# Patient Record
Sex: MALE | Race: WHITE | URBAN - METROPOLITAN AREA
[De-identification: names, ages, dates, MRNs, and addresses within clinical notes are randomized per-mention and may not be internally consistent; named-entity substitution may affect disease eponyms.]

---

## 2018-01-16 ENCOUNTER — TELEPHONE (OUTPATIENT)
Dept: ENT CLINIC | Age: 48
End: 2018-01-16

## 2018-01-18 ENCOUNTER — OFFICE VISIT (OUTPATIENT)
Dept: ENT CLINIC | Age: 48
End: 2018-01-18

## 2018-01-18 VITALS
BODY MASS INDEX: 26.66 KG/M2 | TEMPERATURE: 97.8 F | WEIGHT: 180 LBS | HEART RATE: 57 BPM | OXYGEN SATURATION: 100 % | SYSTOLIC BLOOD PRESSURE: 133 MMHG | HEIGHT: 69 IN | DIASTOLIC BLOOD PRESSURE: 69 MMHG

## 2018-01-18 DIAGNOSIS — K11.20 SIALADENITIS: Primary | ICD-10-CM

## 2018-01-18 PROCEDURE — G8427 DOCREV CUR MEDS BY ELIG CLIN: HCPCS | Performed by: OTOLARYNGOLOGY

## 2018-01-18 PROCEDURE — G8484 FLU IMMUNIZE NO ADMIN: HCPCS | Performed by: OTOLARYNGOLOGY

## 2018-01-18 PROCEDURE — G8419 CALC BMI OUT NRM PARAM NOF/U: HCPCS | Performed by: OTOLARYNGOLOGY

## 2018-01-18 PROCEDURE — 99203 OFFICE O/P NEW LOW 30 MIN: CPT | Performed by: OTOLARYNGOLOGY

## 2018-01-18 PROCEDURE — 1036F TOBACCO NON-USER: CPT | Performed by: OTOLARYNGOLOGY

## 2018-01-18 RX ORDER — IBUPROFEN 200 MG
200 TABLET ORAL EVERY 6 HOURS PRN
COMMUNITY

## 2018-01-18 RX ORDER — AMOXICILLIN AND CLAVULANATE POTASSIUM 875; 125 MG/1; MG/1
1 TABLET, FILM COATED ORAL 2 TIMES DAILY
COMMUNITY

## 2018-01-18 RX ORDER — CHLORAL HYDRATE 500 MG
CAPSULE ORAL
COMMUNITY

## 2018-01-18 NOTE — PROGRESS NOTES
133/69   Site: Left Arm   Position: Sitting   Cuff Size: Medium Adult   Pulse: 57   Temp: 97.8 °F (36.6 °C)   TempSrc: Oral   SpO2: 100%   Weight: 180 lb (81.6 kg)   Height: 5' 9\" (1.753 m)     GENERAL. APPEARANCE:  Well developed, well nourished, no apparent distress, no apparent deformities. ABILITY TO COMMUNICATE/QUALITY OF VOICE:  Communicated without difficulty. Normal voice, with no hot potato quality. INSPECTION, HEAD AND FACE:  Normal overall appearance, with no scars, lesions or masses. SINUSES:  The maxillary and frontal sinuses were nontender, bilaterally. SALIVARY GLANDS:  Parotid, submandibular and sublingual glands were normal.  FACIAL STRENGTH, MOTION:  Normal and equal for all five branches bilaterally. EYES:  Extraocular motion:  intact, normal primary gaze alignment. EARS, OTOSCOPY:  A mild cerumen accumulation was removed from the right external auditory canal.  The TMs and EACs appeared to be normal bilaterally. EXTERNAL EAR/NOSE:  Normal pinnae and mastoids. Normal external nose. NOSE:  The nasal septum was midline. The inferior turbinates were normal.  The nasal mucosa and secretions were normal.  No pus or polyps were seen. LIPS, TEETH AND GUMS:  Normal.  (+) OROPHARYNX/ORAL CAVITY:  There was a white discoloration, induration, and swelling of the left Guilford's duct papilla. There was a possible stone palpable just proximal to the Guilford's papilla. The orifice of the papilla appeared to be patent and normal appearing saliva was expressed on milking of the gland. Attempts at milking the gland and duct and the papilla with digital palpation and with a Q-tip at the papilla, to attempt to express a stone were not successful. There were several nodular areas along the left Guilford's duct which were consistent with possible stone versus sublingual gland sialadenitis. There was an ulceration on the superior left tonsil approximately 1 cm in diameter with a granulomatous.   There was

## 2024-08-06 ENCOUNTER — HOSPITAL ENCOUNTER (EMERGENCY)
Age: 54
Discharge: HOME | End: 2024-08-06
Payer: COMMERCIAL

## 2024-08-06 ENCOUNTER — APPOINTMENT (OUTPATIENT)
Dept: CT IMAGING | Age: 54
End: 2024-08-06
Payer: COMMERCIAL

## 2024-08-06 VITALS
TEMPERATURE: 97.7 F | DIASTOLIC BLOOD PRESSURE: 83 MMHG | SYSTOLIC BLOOD PRESSURE: 144 MMHG | WEIGHT: 187.39 LBS | OXYGEN SATURATION: 100 % | HEIGHT: 70 IN | HEART RATE: 57 BPM | BODY MASS INDEX: 26.83 KG/M2

## 2024-08-06 DIAGNOSIS — N20.0 KIDNEY STONE: Primary | ICD-10-CM

## 2024-08-06 LAB
ANION GAP SERPL CALCULATED.3IONS-SCNC: 9 MMOL/L (ref 3–16)
BACTERIA URNS QL MICRO: ABNORMAL /HPF
BASOPHILS # BLD: 0 K/UL (ref 0–0.2)
BASOPHILS NFR BLD: 0.4 %
BILIRUB UR QL STRIP.AUTO: NEGATIVE
BUN SERPL-MCNC: 23 MG/DL (ref 7–20)
CALCIUM SERPL-MCNC: 10 MG/DL (ref 8.3–10.6)
CHLORIDE SERPL-SCNC: 104 MMOL/L (ref 99–110)
CLARITY UR: CLEAR
CO2 SERPL-SCNC: 27 MMOL/L (ref 21–32)
COLOR UR: YELLOW
CREAT SERPL-MCNC: 1.2 MG/DL (ref 0.9–1.3)
DEPRECATED RDW RBC AUTO: 14 % (ref 12.4–15.4)
EOSINOPHIL # BLD: 0.1 K/UL (ref 0–0.6)
EOSINOPHIL NFR BLD: 0.8 %
EPI CELLS #/AREA URNS AUTO: 1 /HPF (ref 0–5)
GFR SERPLBLD CREATININE-BSD FMLA CKD-EPI: 73 ML/MIN/{1.73_M2}
GLUCOSE SERPL-MCNC: 99 MG/DL (ref 70–99)
GLUCOSE UR STRIP.AUTO-MCNC: NEGATIVE MG/DL
HCT VFR BLD AUTO: 42.7 % (ref 40.5–52.5)
HGB BLD-MCNC: 14.7 G/DL (ref 13.5–17.5)
HGB UR QL STRIP.AUTO: ABNORMAL
HYALINE CASTS #/AREA URNS AUTO: 0 /LPF (ref 0–8)
KETONES UR STRIP.AUTO-MCNC: NEGATIVE MG/DL
LEUKOCYTE ESTERASE UR QL STRIP.AUTO: NEGATIVE
LYMPHOCYTES # BLD: 0.7 K/UL (ref 1–5.1)
LYMPHOCYTES NFR BLD: 11.8 %
MCH RBC QN AUTO: 28.8 PG (ref 26–34)
MCHC RBC AUTO-ENTMCNC: 34.5 G/DL (ref 31–36)
MCV RBC AUTO: 83.3 FL (ref 80–100)
MONOCYTES # BLD: 0.4 K/UL (ref 0–1.3)
MONOCYTES NFR BLD: 6.4 %
NEUTROPHILS # BLD: 4.9 K/UL (ref 1.7–7.7)
NEUTROPHILS NFR BLD: 80.6 %
NITRITE UR QL STRIP.AUTO: NEGATIVE
PH UR STRIP.AUTO: 5.5 [PH] (ref 5–8)
PLATELET # BLD AUTO: 212 K/UL (ref 135–450)
PMV BLD AUTO: 7.4 FL (ref 5–10.5)
POTASSIUM SERPL-SCNC: 5 MMOL/L (ref 3.5–5.1)
PROT UR STRIP.AUTO-MCNC: ABNORMAL MG/DL
RBC # BLD AUTO: 5.13 M/UL (ref 4.2–5.9)
RBC CLUMPS #/AREA URNS AUTO: 9 /HPF (ref 0–4)
SODIUM SERPL-SCNC: 140 MMOL/L (ref 136–145)
SP GR UR STRIP.AUTO: 1.02 (ref 1–1.03)
UA COMPLETE W REFLEX CULTURE PNL UR: ABNORMAL
UA DIPSTICK W REFLEX MICRO PNL UR: YES
URN SPEC COLLECT METH UR: ABNORMAL
UROBILINOGEN UR STRIP-ACNC: 0.2 E.U./DL
WBC # BLD AUTO: 6.1 K/UL (ref 4–11)
WBC #/AREA URNS AUTO: 2 /HPF (ref 0–5)

## 2024-08-06 PROCEDURE — 80048 BASIC METABOLIC PNL TOTAL CA: CPT

## 2024-08-06 PROCEDURE — 85025 COMPLETE CBC W/AUTO DIFF WBC: CPT

## 2024-08-06 PROCEDURE — 74177 CT ABD & PELVIS W/CONTRAST: CPT

## 2024-08-06 PROCEDURE — 99285 EMERGENCY DEPT VISIT HI MDM: CPT

## 2024-08-06 PROCEDURE — 6360000004 HC RX CONTRAST MEDICATION: Performed by: PHYSICIAN ASSISTANT

## 2024-08-06 PROCEDURE — 81001 URINALYSIS AUTO W/SCOPE: CPT

## 2024-08-06 RX ORDER — TAMSULOSIN HYDROCHLORIDE 0.4 MG/1
0.4 CAPSULE ORAL DAILY
Qty: 30 CAPSULE | Refills: 0 | Status: SHIPPED | OUTPATIENT
Start: 2024-08-06 | End: 2024-09-05

## 2024-08-06 RX ORDER — ONDANSETRON 4 MG/1
4 TABLET, ORALLY DISINTEGRATING ORAL 3 TIMES DAILY PRN
Qty: 12 TABLET | Refills: 0 | Status: SHIPPED | OUTPATIENT
Start: 2024-08-06

## 2024-08-06 RX ORDER — HYDROCODONE BITARTRATE AND ACETAMINOPHEN 5; 325 MG/1; MG/1
1 TABLET ORAL EVERY 6 HOURS PRN
Qty: 12 TABLET | Refills: 0 | Status: SHIPPED | OUTPATIENT
Start: 2024-08-06 | End: 2024-08-09

## 2024-08-06 RX ADMIN — IOPAMIDOL 75 ML: 755 INJECTION, SOLUTION INTRAVENOUS at 13:06

## 2024-08-06 ASSESSMENT — ENCOUNTER SYMPTOMS
EYE PAIN: 0
BACK PAIN: 0
ABDOMINAL PAIN: 0
SHORTNESS OF BREATH: 0
SORE THROAT: 0
COUGH: 0
NAUSEA: 1
VOMITING: 0

## 2024-08-06 ASSESSMENT — PAIN SCALES - GENERAL: PAINLEVEL_OUTOF10: 0

## 2024-08-06 ASSESSMENT — PAIN - FUNCTIONAL ASSESSMENT
PAIN_FUNCTIONAL_ASSESSMENT: 0-10
PAIN_FUNCTIONAL_ASSESSMENT: ACTIVITIES ARE NOT PREVENTED

## 2024-08-06 ASSESSMENT — PAIN DESCRIPTION - DESCRIPTORS: DESCRIPTORS: ACHING

## 2024-08-06 ASSESSMENT — PAIN DESCRIPTION - LOCATION: LOCATION: ABDOMEN

## 2024-08-06 ASSESSMENT — LIFESTYLE VARIABLES
HOW MANY STANDARD DRINKS CONTAINING ALCOHOL DO YOU HAVE ON A TYPICAL DAY: 1 OR 2
HOW OFTEN DO YOU HAVE A DRINK CONTAINING ALCOHOL: MONTHLY OR LESS

## 2024-08-06 ASSESSMENT — PAIN DESCRIPTION - PAIN TYPE: TYPE: ACUTE PAIN

## 2024-08-06 ASSESSMENT — PAIN DESCRIPTION - ORIENTATION: ORIENTATION: RIGHT

## 2024-08-06 NOTE — DISCHARGE INSTRUCTIONS
Take prescribed medication as prescribed only  Follow-up with urologist Dr. Kaden Allen  Return emergency room for any worsening.  This include worsening pain, fever, any persistent nausea vomiting.

## 2024-08-06 NOTE — ED TRIAGE NOTES
Patient arrived to the ED ambulatory from home w/ complaints of abdominal pain.     Patient/EMS reports states Onset around 0945 while during and exam on a patient, broke out w/ sweats and N/V and severe pain on the RLQ pain lasted about  an hour w/ fluctuation in the pain but over the past 15 minutes pain has gotten better. Reports still having appendix     Patient A&O x 4, VSS w/ exception of elevated BP (doesn't have HX) ,

## 2024-08-06 NOTE — ED PROVIDER NOTES
hydroureteronephrosis.           No results found.   No results found.    MEDICAL DECISION MAKING / ED COURSE:      PROCEDURES:   Procedures    Patient was given:  Medications   iopamidol (ISOVUE-370) 76 % injection 75 mL (75 mLs IntraVENous Given 8/6/24 1306)       CONSULTS: (Who and What was discussed)  None      Chronic Conditions affecting care:    has no past medical history on file.     Records Reviewed (External and Source)   I personally reviewed patient medical record    CC/HPI Summary, DDx, ED Course, and Reassessment:   Emergency room course: See HPI and physical exam above  Patient on exam cardiovascular regular rhythm, lungs are clear no wheeze rales or rhonchi noted.  No chest wall tenderness with palpation.  Abdomen is soft.  Nontender.  Normal bowel sounds all 4 quadrant.  Nondistended.  No palpable mass.  No CVA or flank tenderness at this time.  Patient has no reproducible flank tenderness with flexion extension or lateral bending.  Full range of motion all extremity alert oriented x 4.  Does not appear to be in acute distress.    Lab result from today shows:  CBC within normal limits with a white count of 6.1.  BMP shows BUN 23 creatinine 1.2.  No comparison.    Urinalysis shows large blood.  Microscopically bacteria is none seen, hyaline casts 0, WBC 2, RBC of 9, epithelial cells of 1.    CT abdomen pelvis shows a 3 mm obstructing calculus at the right UVJ causing mild right hydronephrosis.    Patient tells me still not having any pain.  And I discussed his CT results and lab result from today with him.  Discussed discharge plan.-Follow-up with urology.  He will be placed on Flomax, Norco for pain and Zofran for any nausea.  Instructed return for any worsening pain, fevers any persistent nausea and vomiting.  He understood and him and his wife.  They are both okay with being discharged.  He will be discharged stable condition.    Disposition Considerations (Tests not ordered but considered,